# Patient Record
Sex: MALE | Race: WHITE | Employment: UNEMPLOYED | URBAN - METROPOLITAN AREA
[De-identification: names, ages, dates, MRNs, and addresses within clinical notes are randomized per-mention and may not be internally consistent; named-entity substitution may affect disease eponyms.]

---

## 2021-08-18 ENCOUNTER — HOSPITAL ENCOUNTER (EMERGENCY)
Age: 41
Discharge: HOME | End: 2021-08-18
Attending: EMERGENCY MEDICINE
Payer: COMMERCIAL

## 2021-08-18 ENCOUNTER — APPOINTMENT (OUTPATIENT)
Dept: GENERAL RADIOLOGY | Age: 41
End: 2021-08-18
Payer: COMMERCIAL

## 2021-08-18 VITALS
TEMPERATURE: 97.8 F | HEART RATE: 100 BPM | SYSTOLIC BLOOD PRESSURE: 114 MMHG | HEIGHT: 71 IN | OXYGEN SATURATION: 98 % | DIASTOLIC BLOOD PRESSURE: 84 MMHG | WEIGHT: 200 LBS | BODY MASS INDEX: 28 KG/M2 | RESPIRATION RATE: 22 BRPM

## 2021-08-18 DIAGNOSIS — M25.562 ACUTE PAIN OF LEFT KNEE: ICD-10-CM

## 2021-08-18 DIAGNOSIS — S83.92XA SPRAIN OF LEFT KNEE, UNSPECIFIED LIGAMENT, INITIAL ENCOUNTER: Primary | ICD-10-CM

## 2021-08-18 PROCEDURE — 99285 EMERGENCY DEPT VISIT HI MDM: CPT

## 2021-08-18 PROCEDURE — 73562 X-RAY EXAM OF KNEE 3: CPT

## 2021-08-18 ASSESSMENT — PAIN DESCRIPTION - LOCATION: LOCATION: LEG

## 2021-08-18 ASSESSMENT — PAIN DESCRIPTION - PROGRESSION: CLINICAL_PROGRESSION: NOT CHANGED

## 2021-08-18 ASSESSMENT — PAIN DESCRIPTION - FREQUENCY: FREQUENCY: CONTINUOUS

## 2021-08-18 ASSESSMENT — PAIN - FUNCTIONAL ASSESSMENT: PAIN_FUNCTIONAL_ASSESSMENT: PREVENTS OR INTERFERES SOME ACTIVE ACTIVITIES AND ADLS

## 2021-08-18 ASSESSMENT — PAIN DESCRIPTION - PAIN TYPE: TYPE: ACUTE PAIN

## 2021-08-18 ASSESSMENT — PAIN DESCRIPTION - DESCRIPTORS: DESCRIPTORS: ACHING;SORE

## 2021-08-18 ASSESSMENT — PAIN DESCRIPTION - ORIENTATION: ORIENTATION: LEFT

## 2021-08-18 ASSESSMENT — PAIN DESCRIPTION - ONSET: ONSET: GRADUAL

## 2021-08-18 ASSESSMENT — PAIN SCALES - GENERAL: PAINLEVEL_OUTOF10: 7

## 2021-08-18 NOTE — ED PROVIDER NOTES
CHIEF COMPLAINT    Chief Complaint   Patient presents with    Leg Pain     HPI  Parris Haddad is a 35 y.o. male with history of hepatitis C and substance abuse who presents to the ED with complaints of left lower leg/left knee pain. Patient states that he was walking from [place] back to home in [facility] since 3 PM yesterday. He states he is currently homeless and was walking from a homeless shelter in [place]. It seems that he was recently incarcerated and released. As the patient was walking he tells me he began to have some pain along the left lower leg predominantly in the left popliteal fossa and medial aspect of the left knee. He cannot member any known trauma to the area but states he may have twisted his knee while walking today. His pain describes a throbbing and aching sensation rated as moderate to severe in severity exacerbated with movement and palpation. Symptoms are constant. Pain does not radiate. Denies fevers, chills, numbness, tingling, nausea, vomiting. REVIEW OF SYSTEMS  Constitutional: No fever, chills or recent illness. Eye: No visual changes  HENT: No earache or sore throat. Resp: No SOB or productive cough. Cardio: No chest pain or palpitations. GI: No abdominal pain, nausea, vomiting, constipation or diarrhea. No melena. : No dysuria, urgency or frequency. Endocrine: No heat intolerance, no cold intolerance, no polydipsia   Lymphatics: No adenopathy  Musculoskeletal: Complains of left lower leg/left knee pain  Neuro: No headaches. Psych: No homicidal or suicidal thoughts  Skin: No rash, No itching. ?  ?   PAST MEDICAL HISTORY  Past Medical History:   Diagnosis Date    Hepatitis C     Hypertension      FAMILY HISTORY  Family History   Problem Relation Age of Onset    High Blood Pressure Mother     Substance Abuse Father     Cancer Father      SOCIAL HISTORY  Social History     Socioeconomic History    Marital status: Legally      Spouse name: None    Number of children: 5    Years of education: 15    Highest education level: None   Occupational History    None   Tobacco Use    Smoking status: Current Every Day Smoker     Packs/day: 0.50     Years: 15.00     Pack years: 7.50     Types: Cigarettes    Smokeless tobacco: Never Used   Substance and Sexual Activity    Alcohol use: No    Drug use: No     Frequency: 7.0 times per week     Types: Other-see comments     Comment: 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     Sexual activity: Yes     Partners: Female   Other Topics Concern    None   Social History Narrative    None     Social Determinants of Health     Financial Resource Strain:     Difficulty of Paying Living Expenses:    Food Insecurity:     Worried About Running Out of Food in the Last Year:     [address] in the Last Year:    Transportation Needs:     Lack of Transportation (Medical):  Lack of Transportation (Non-Medical):    Physical Activity:     Days of Exercise per Week:     Minutes of Exercise per Session:    Stress:     Feeling of Stress :    Social Connections:     Frequency of Communication with Friends and Family:     Frequency of Social Gatherings with Friends and Family:     Attends [employer] Services:     Active Member of Clubs or Organizations:     Attends Club or Organization Meetings:     Marital Status:    Intimate Partner Violence:     Fear of Current or Ex-Partner:     Emotionally Abused:     Physically Abused:     Sexually Abused:        SURGICAL HISTORY  History reviewed. No pertinent surgical history.   CURRENT MEDICATIONS  Previous Medications    No medications on file     ALLERGIES  Allergies   Allergen Reactions    Codeine Hives    Dimetapp Cold-Allergy [Brompheniramine-Phenylephrine] Other (See Comments)     Told by parents to not take       Nursing notes reviewed by myself for past medical history, family history, social history, surgical history, current medications, and allergies. PHYSICAL EXAM  VITAL SIGNS: Triage VS:    ED Triage Vitals [08/18/21 0429]   Enc Vitals Group      /84      Pulse 100      Resp 22      Temp 97.8 °F (36.6 °C)      Temp Source Oral      SpO2 98 %      Weight 200 lb (90.7 kg)      Height 5' 11\" (1.803 m)      Head Circumference       Peak Flow       Pain Score       Pain Loc       Pain Edu? Excl. in [address]? Constitutional: Well developed, Well nourished, nontoxic appearing  HENT: Normocephalic, Atraumatic, Bilateral external ears normal, Nose normal.   Eyes: Conjunctiva normal, No discharge. No scleral icterus. Neck: Normal range of motion, No tenderness, Supple. Lymphatic: No lymphadenopathy noted. Cardiovascular: Normal heart rate, Normal rhythm, No murmurs, gallops or rubs. Thorax & Lungs: Normal breath sounds, No respiratory distress, No wheezing. Skin: Warm, Dry, Pink, No mottling, No erythema, No rash. Extremities:  No cyanosis, Normal perfusion, No clubbing. Musculoskeletal: Tenderness to palpation to medial aspect of left knee and left popliteal fossa with full range of motion to flexion extension of left knee, no overlying skin changes to left leg, compartments of left lower extremity are soft and compressible, 2+ dorsalis pedis and posterior tibial pulses bilaterally  Neurologic: Alert & oriented x 3, No focal deficits noted. RADIOLOGY  Labs Reviewed - No data to display  I personally reviewed the images. The radiologist's interpretation reveals:  Last Imaging results   XR KNEE LEFT (3 VIEWS)   Preliminary Result   1. No acute osseous or soft tissue abnormality of the left knee. MEDS GIVEN IN ED:  Medications - No data to display  [facility]  68-year-old male presents the emergency department complaints of some pain in the left lower leg predominantly to the left knee while walking today. On exam he has reproducible tenderness to palpation along medial aspect of the left knee. Patient has full range of motion to flexion extension of left knee and the left lower extremity is neurovascularly intact. Given the patient's concern for knee injury and his uncertainty about whether he may have twisted his knee will obtain x-ray. He was offered pain medication here which he refused. Patient's x-rays are without acute osseous injury. At this time given reassuring evaluation I feel patient is appropriate for discharge. Follow-up resource information for primary care provider provided. Return precautions given. Appropriate PPE utilized as indicated for entire patient encounter? Time of Disposition: See timeline  ? New Prescriptions    No medications on file     FINAL IMPRESSION  1. Sprain of left knee, unspecified ligament, initial encounter    2. Acute pain of left knee        Electronically signed by:  Maribel Rowland DO, 8/18/2021         Maribel Rowland DO  08/18/21 [id number]

## 2021-08-18 NOTE — PROGRESS NOTES
Patient comes to ED with complaint of left leg pain after walking from [facility]. Patient states he took a nap at [employer] up the street and after a little nap walked by this ED and thought while here he should get evaluated for is left leg pain. Patient states the pain is posterior of his left leg behind the knee. Patient reports that his journey from [facility] started at 3 pm yesterday.